# Patient Record
Sex: FEMALE | Race: WHITE | NOT HISPANIC OR LATINO | Employment: STUDENT | URBAN - METROPOLITAN AREA
[De-identification: names, ages, dates, MRNs, and addresses within clinical notes are randomized per-mention and may not be internally consistent; named-entity substitution may affect disease eponyms.]

---
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
## 2023-09-22 ENCOUNTER — ESTABLISHED PATIENT (OUTPATIENT)
Dept: URBAN - METROPOLITAN AREA CLINIC 10 | Facility: CLINIC | Age: 18
End: 2023-09-22

## 2023-09-22 DIAGNOSIS — H52.13: ICD-10-CM

## 2023-09-22 PROCEDURE — 92310C CONTACT LENS 75

## 2023-09-22 PROCEDURE — 92014 COMPRE OPH EXAM EST PT 1/>: CPT

## 2023-09-22 PROCEDURE — 92015 DETERMINE REFRACTIVE STATE: CPT

## 2023-09-22 ASSESSMENT — TONOMETRY
OD_IOP_MMHG: 11
OS_IOP_MMHG: 11

## 2023-09-22 ASSESSMENT — KERATOMETRY
OS_AXISANGLE2_DEGREES: 90
OS_K1POWER_DIOPTERS: 43.25
OD_K1POWER_DIOPTERS: 43.75
OD_AXISANGLE_DEGREES: 165
OS_K2POWER_DIOPTERS: 44.00
OD_K2POWER_DIOPTERS: 44.75
OD_AXISANGLE2_DEGREES: 75
OS_AXISANGLE_DEGREES: 180

## 2023-09-22 ASSESSMENT — VISUAL ACUITY
OS_CC: 20/20
OD_CC: 20/20

## 2024-10-07 ENCOUNTER — COMPREHENSIVE EXAM (OUTPATIENT)
Dept: URBAN - METROPOLITAN AREA CLINIC 10 | Facility: CLINIC | Age: 19
End: 2024-10-07

## 2024-10-07 DIAGNOSIS — H52.13: ICD-10-CM

## 2024-10-07 PROCEDURE — 92310-1 LEVEL 1 CONTACT LENS MANAGEMENT

## 2024-10-07 PROCEDURE — 92015 DETERMINE REFRACTIVE STATE: CPT

## 2024-10-07 PROCEDURE — 92014 COMPRE OPH EXAM EST PT 1/>: CPT
